# Patient Record
Sex: FEMALE
[De-identification: names, ages, dates, MRNs, and addresses within clinical notes are randomized per-mention and may not be internally consistent; named-entity substitution may affect disease eponyms.]

---

## 2019-10-11 ENCOUNTER — RECORDS - HEALTHEAST (OUTPATIENT)
Dept: ADMINISTRATIVE | Facility: OTHER | Age: 34
End: 2019-10-11

## 2020-01-20 ENCOUNTER — AMBULATORY - HEALTHEAST (OUTPATIENT)
Dept: CARE COORDINATION | Facility: CLINIC | Age: 35
End: 2020-01-20

## 2020-01-20 ENCOUNTER — OFFICE VISIT - HEALTHEAST (OUTPATIENT)
Dept: MIDWIFE SERVICES | Facility: CLINIC | Age: 35
End: 2020-01-20

## 2020-01-20 DIAGNOSIS — Z60.9 HIGH RISK SOCIAL SITUATION: ICD-10-CM

## 2020-01-20 DIAGNOSIS — T74.91XA DOMESTIC VIOLENCE OF ADULT: ICD-10-CM

## 2020-01-20 DIAGNOSIS — R10.2 PELVIC PAIN: ICD-10-CM

## 2020-01-20 DIAGNOSIS — Z63.9 RELATIONSHIP PROBLEMS: ICD-10-CM

## 2020-01-20 DIAGNOSIS — R10.2 VAGINAL PAIN: ICD-10-CM

## 2020-01-20 LAB
ALBUMIN UR-MCNC: NEGATIVE MG/DL
APPEARANCE UR: CLEAR
BILIRUB UR QL STRIP: NEGATIVE
CLUE CELLS: NORMAL
COLOR UR AUTO: YELLOW
GLUCOSE UR STRIP-MCNC: NEGATIVE MG/DL
HGB UR QL STRIP: NEGATIVE
HIV 1+2 AB+HIV1 P24 AG SERPL QL IA: NEGATIVE
KETONES UR STRIP-MCNC: NEGATIVE MG/DL
LEUKOCYTE ESTERASE UR QL STRIP: NEGATIVE
NITRATE UR QL: NEGATIVE
PH UR STRIP: 5.5 [PH] (ref 5–8)
SP GR UR STRIP: 1.02 (ref 1–1.03)
TRICHOMONAS, WET PREP: NORMAL
UROBILINOGEN UR STRIP-ACNC: NORMAL
YEAST, WET PREP: NORMAL

## 2020-01-20 SDOH — SOCIAL STABILITY - SOCIAL INSECURITY: PROBLEM RELATED TO PRIMARY SUPPORT GROUP, UNSPECIFIED: Z63.9

## 2020-01-20 SDOH — SOCIAL STABILITY - SOCIAL INSECURITY: PROBLEM RELATED TO SOCIAL ENVIRONMENT, UNSPECIFIED: Z60.9

## 2020-01-20 ASSESSMENT — MIFFLIN-ST. JEOR: SCORE: 1022.48

## 2020-01-21 ENCOUNTER — AMBULATORY - HEALTHEAST (OUTPATIENT)
Dept: NURSING | Facility: CLINIC | Age: 35
End: 2020-01-21

## 2020-01-21 ENCOUNTER — AMBULATORY - HEALTHEAST (OUTPATIENT)
Dept: OBGYN | Facility: CLINIC | Age: 35
End: 2020-01-21

## 2020-01-21 ENCOUNTER — COMMUNICATION - HEALTHEAST (OUTPATIENT)
Dept: CARE COORDINATION | Facility: CLINIC | Age: 35
End: 2020-01-21

## 2020-01-21 ENCOUNTER — COMMUNICATION - HEALTHEAST (OUTPATIENT)
Dept: NURSING | Facility: CLINIC | Age: 35
End: 2020-01-21

## 2020-01-21 LAB
C TRACH DNA SPEC QL PROBE+SIG AMP: NEGATIVE
HBV SURFACE AG SERPL QL IA: NEGATIVE
HCV AB SERPL QL IA: NEGATIVE
HEPATITIS B SURFACE ANTIBODY LHE- HISTORICAL: NEGATIVE
HPV SOURCE: NORMAL
HUMAN PAPILLOMA VIRUS 16 DNA: NEGATIVE
HUMAN PAPILLOMA VIRUS 18 DNA: NEGATIVE
HUMAN PAPILLOMA VIRUS FINAL DIAGNOSIS: NORMAL
HUMAN PAPILLOMA VIRUS OTHER HR: NEGATIVE
N GONORRHOEA DNA SPEC QL NAA+PROBE: NEGATIVE
SPECIMEN DESCRIPTION: NORMAL
T PALLIDUM AB SER QL: NEGATIVE

## 2020-01-22 LAB
HSV SPECIMEN: NORMAL
HSV1 DNA SPEC QL NAA+PROBE: NEGATIVE
HSV2 DNA SPEC QL NAA+PROBE: NEGATIVE

## 2020-01-23 ENCOUNTER — AMBULATORY - HEALTHEAST (OUTPATIENT)
Dept: MIDWIFE SERVICES | Facility: CLINIC | Age: 35
End: 2020-01-23

## 2020-01-23 DIAGNOSIS — R35.0 URINARY FREQUENCY: ICD-10-CM

## 2020-01-24 ENCOUNTER — OFFICE VISIT - HEALTHEAST (OUTPATIENT)
Dept: MIDWIFE SERVICES | Facility: CLINIC | Age: 35
End: 2020-01-24

## 2020-01-24 DIAGNOSIS — F32.A DEPRESSION, UNSPECIFIED DEPRESSION TYPE: ICD-10-CM

## 2020-01-24 DIAGNOSIS — N89.8 ITCHING IN THE VAGINAL AREA: ICD-10-CM

## 2020-01-24 DIAGNOSIS — R35.0 URINARY FREQUENCY: ICD-10-CM

## 2020-01-24 DIAGNOSIS — R30.0 DYSURIA: ICD-10-CM

## 2020-01-24 ASSESSMENT — PATIENT HEALTH QUESTIONNAIRE - PHQ9: SUM OF ALL RESPONSES TO PHQ QUESTIONS 1-9: 17

## 2020-01-24 ASSESSMENT — MIFFLIN-ST. JEOR: SCORE: 1022.48

## 2021-05-26 ASSESSMENT — PATIENT HEALTH QUESTIONNAIRE - PHQ9: SUM OF ALL RESPONSES TO PHQ QUESTIONS 1-9: 17

## 2021-06-04 VITALS
TEMPERATURE: 97.8 F | SYSTOLIC BLOOD PRESSURE: 108 MMHG | WEIGHT: 114 LBS | HEIGHT: 55 IN | DIASTOLIC BLOOD PRESSURE: 80 MMHG | BODY MASS INDEX: 26.38 KG/M2 | HEART RATE: 60 BPM

## 2021-06-04 VITALS
SYSTOLIC BLOOD PRESSURE: 102 MMHG | BODY MASS INDEX: 26.38 KG/M2 | HEART RATE: 80 BPM | HEIGHT: 55 IN | WEIGHT: 114 LBS | DIASTOLIC BLOOD PRESSURE: 50 MMHG
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
## 2021-06-05 NOTE — PROGRESS NOTES
"    PROBLEM VISIT: PELVIC PAIN FOLLOW UP      Assessment:     34 y.o. female here for:    The patient has   1. Urinary frequency Ambulatory referral to Urology   2. Dysuria  Ambulatory referral to Urology   3. Itching in the vaginal area  Ambulatory referral to Urology   . 4.  Social Concerns  5.  Depression     Plan:     1. Urgent referral to urology.  Did discuss possibility of dietary sensitivities/irritants to genitourinary system.  2. Resources given for social support.      Subjective:      Ashley Reveles is a 34 y.o. female who presents for follow-up clinic visit.  Excellent  present and interpreting for entire visit.  Was seen this past Monday, 1/20/2020, see notes.   Patient reports urinating \"30 times a day\", sharp burning pain with urination \"like knives going in\", perineal and vaginal itching.  Reports symptoms have been going on for greater than 1 year.  She has been tested at immigration and other clinics for STDs, other infections, etc. and all has come back negative every time.  PHQ 9 in E[place]an given to patient, score = 17 \"very difficult\".  Today denies wanting to harm herself- Asked if it was because of social/relational issues  , Patient reports mood mostly \"because of the illness and all the problems (associated with it)\".   Reviewed lab results from that visit: All within normal limits.  Pap cytology report pending.  Offered pelvic ultrasound-patient declined stating \"I have already had that done and was normal\", concerned healthcare visit cost her too much money.      Records from [place] not available until after patient visit.  Did receive records after visit for provider review.  10/11/2019: ED visit for pelvic pain- labs, UA, abdominal/pelvic ultrasound, and CT scan of abdomen and pelvis with contrast all WNL with exception of CT scan: Heart borderline enlarged, bilateral thickening of the round ligaments, and a small helical hernia containing omental fat, none of which " "were noted to be clinically significant.    Faxed results scanned into chart under media tab.      Objective:        Vitals:    01/24/20 0913   BP: 102/50   Pulse: 80   Weight: 114 lb (51.7 kg)   Height: 4' 5\" (1.346 m)       Physical Exam:  General Appearance: Alert, cooperative, no physical distress, moderate emotional distress, appears stated age, hair appears somewhat greasy and is pulled back in ponytail, no make-up.  Eyes tearing up at times.  HEENT, Neck, Back, Breasts, Abdomen: Teeth possibly needing periodontal cleaning, otherwise no obvious abnormalities, detailed exam not performed.  Cardiovascular: No edema   Respiratory: Breathing unlabored  Pelvic: Not repeated today  Extremities: Extremities grossly normal, atraumatic, no obvious cyanosis or edema  Skin: Skin color, texture, turgor normal, no obvious rashes or lesions  Neurologic: Grossly normal     Lab Review  Results for orders placed or performed in visit on 01/20/20   Chlamydia trachomatis & Neisseria gonorrhoeae, Amplified Detection   Result Value Ref Range    Chlamydia trachomatis, Amplified Detection Negative Negative    Neisseria gonorrhoeae, Amplified Detection Negative Negative   Wet Prep, genital   Result Value Ref Range    Yeast Result No yeast seen No yeast seen    Trichomonas No Trichomonas seen No Trichomonas seen    Clue Cells, Wet Prep No Clue cells seen No Clue cells seen   HSV PCR   Result Value Ref Range    HSV Specimen Labia     HSV Type 1 Negative NEG    HSV Type 2 Negative NEG   Urinalysis-UC if Indicated   Result Value Ref Range    Color, UA Yellow Colorless, Yellow, Straw, Light Yellow    Clarity, UA Clear Clear    Glucose, UA Negative Negative    Bilirubin, UA Negative Negative    Ketones, UA Negative Negative    Specific Gravity, UA 1.025 1.005 - 1.030    Blood, UA Negative Negative    pH, UA 5.5 5.0 - 8.0    Protein, UA Negative Negative mg/dL    Urobilinogen, UA 0.2 E.U./dL 0.2 E.U./dL, 1.0 E.U./dL    Nitrite, UA Negative " Negative    Leukocytes, UA Negative Negative   HIV Antigen/Antibody Screening Cascade   Result Value Ref Range    HIV Antigen / Antibody Negative Negative   Hepatitis C Antibody (Anti-HCV)   Result Value Ref Range    Hepatitis C Ab Negative Negative   Syphilis Screen, Cascade   Result Value Ref Range    Treponema Antibody (Syphilis) Negative Negative   Hepatitis B Surface Antigen (HBsAG)   Result Value Ref Range    Hepatitis B Surface Ag Negative Negative   Hepatitis B Surface Antibody (Anti-HBs)   Result Value Ref Range    Hep B Surface Antibody Negative Negative   HPV High Risk DNA Cervical   Result Value Ref Range    HPV Source SurePath     HPV16 DNA Negative NEG    HPV18 DNA Negative NEG    Other HR HPV Negative NEG    Final Diagnosis SEE NOTES     Specimen Description Cervical Cells           Total time spent with patient: 15 minutes, >50% time spent counseling and coordinating care.  Dimple COREY, CNRACHID, CLC  1/24/2020 10:20 AM

## 2021-06-05 NOTE — PROGRESS NOTES
Clinic Care Coordination Contact     Pt was a no show for initial CCC SW assessment x1. No further outreach by CCC team will be done at this time due to privacy concerns.
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
## 2021-06-16 PROBLEM — R10.2 PELVIC PAIN: Status: ACTIVE | Noted: 2020-01-20

## 2021-06-16 PROBLEM — Z60.9 HIGH RISK SOCIAL SITUATION: Status: ACTIVE | Noted: 2020-01-20

## 2021-06-16 PROBLEM — F32.A DEPRESSION: Status: ACTIVE | Noted: 2020-01-24

## 2021-06-16 PROBLEM — R35.0 URINARY FREQUENCY: Status: ACTIVE | Noted: 2020-01-24

## 2021-06-16 PROBLEM — N89.8 ITCHING IN THE VAGINAL AREA: Status: ACTIVE | Noted: 2020-01-24
